# Patient Record
Sex: FEMALE | Race: WHITE | NOT HISPANIC OR LATINO | Employment: PART TIME
[De-identification: names, ages, dates, MRNs, and addresses within clinical notes are randomized per-mention and may not be internally consistent; named-entity substitution may affect disease eponyms.]

---

## 2023-06-05 ENCOUNTER — TRANSCRIBE ORDERS (OUTPATIENT)
Dept: OTHER | Age: 63
End: 2023-06-05

## 2023-06-05 DIAGNOSIS — K76.89 HEPATIC CYST: Primary | ICD-10-CM

## 2023-07-19 ENCOUNTER — OFFICE VISIT (OUTPATIENT)
Dept: GASTROENTEROLOGY | Facility: CLINIC | Age: 63
End: 2023-07-19
Attending: PHYSICIAN ASSISTANT
Payer: COMMERCIAL

## 2023-07-19 VITALS — HEART RATE: 64 BPM | SYSTOLIC BLOOD PRESSURE: 122 MMHG | DIASTOLIC BLOOD PRESSURE: 67 MMHG

## 2023-07-19 DIAGNOSIS — K76.89 LIVER CYST: Primary | ICD-10-CM

## 2023-07-19 DIAGNOSIS — K76.89 HEPATIC CYST: ICD-10-CM

## 2023-07-19 PROCEDURE — 99205 OFFICE O/P NEW HI 60 MIN: CPT | Performed by: STUDENT IN AN ORGANIZED HEALTH CARE EDUCATION/TRAINING PROGRAM

## 2023-07-19 RX ORDER — BUPRENORPHINE AND NALOXONE 2; .5 MG/1; MG/1
2 FILM, SOLUBLE BUCCAL; SUBLINGUAL 2 TIMES DAILY
COMMUNITY
Start: 2023-05-09

## 2023-07-19 RX ORDER — ONDANSETRON 4 MG/1
4 TABLET, FILM COATED ORAL PRN
COMMUNITY
Start: 2023-05-26

## 2023-07-19 RX ORDER — AMLODIPINE BESYLATE 5 MG/1
1 TABLET ORAL DAILY
COMMUNITY
Start: 2022-01-17

## 2023-07-19 RX ORDER — IBUPROFEN 600 MG/1
600 TABLET, FILM COATED ORAL PRN
COMMUNITY
Start: 2022-09-07

## 2023-07-19 RX ORDER — LISINOPRIL 20 MG/1
1 TABLET ORAL EVERY MORNING
COMMUNITY
Start: 2023-01-15

## 2023-07-19 NOTE — PROGRESS NOTES
[facility] New Patient Visit    Date of Visit: July 19, 2023    Reason for referral: liver cyst    Subjective: Ms. Reddy is a 63 year old woman who presents for further evaluation of a liver disease.     This was first found on a CT scan 2012- In the anterior segment of the right lobe of the liver there is a simple cyst that measures 3.8 x 3.7 centimeters.   The   patient is status post splenectomy.      US 6/2015 - The liver is 11.5 cm in craniocaudad length with multiple cysts the largest of 5.5 cm.     CT 6/2015 - Multiple cysts the largest is bilobed and up to 5.7 cm in size x 6.0     CT 1/2019 - There are several hypoattenuating lesions within the liver.  The largest is   either a multi septated cyst or a few adjacent cysts measuring 7.9 x 6.5 cm in   conglomeration.      MRI 4/2021 - T2 hyperintense lesion identified within the anterior right hepatic lobe   extending into the medial left hepatic lobe with a few septations measuring   measuring approximately 7.7 x 7.7 x 7.0 cm in size.  Similar but smaller lesion   identified within the right and left hepatic lobes measuring up to 1 cm in   size.  No suspicious areas of enhancement are identified.  No suspicious   enhancing liver lesions are identified.  The hepatic and portal veins are   patent.  No pathologically enlarged retroperitoneal lymph nodes.  No suspicious   areas of enhancement.  No suspicious areas of restricted diffusion.  IVC filter   is visualized.     MRI 4/2022 - There is no significant signal dropout in the liver to suggest fatty   infiltration on out of phase imaging.  There is a large septated cystic lesion   in segments 7, 8, and 4 a. this appears to have disrupted the hepatic capsule   with some fluid layering along the anterior and superior hepatic dome.  There   is no internal enhancement of the septations.  The lesion measures 7.9 x 9.5 by   7.7 cm.  This is not significantly changed from imaging in October  given   differences in technique.  Capsular rupture is new from April 2, 2021.      MRI Liver 5/2023 - Large T2 hyperintense lesion in the right hepatic lobe 9.1 by 8.7 by 9.0 cm.   Multiple septations along the anterior aspect which do not demonstrate   postcontrast enhancement.  No suspicious nodularity.  Several other smaller T2   hyperintense lesions within the liver most likely representing cysts.     No previous known liver disease, abnormal LFTs, imaging tests. No known history of liver decompensation    ROS: 14 point ROS negative except for positives noted in HPI.    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  BRCA mutation   Small bowel obstructions - improved with miralax, did not require surgery  Abdominal hernias    FamHx:  Breast cancer  BRCA  No family history of liver disease, liver cancer    SocHx:  Social History     Socioeconomic History     Marital status:      Spouse name: Not on file     Number of children: Not on file     Years of education: Not on file     Highest education level: Not on file   Occupational History     Not on file   Tobacco Use     Smoking status: Not on file     Smokeless tobacco: Not on file   Substance and Sexual Activity     Alcohol use: Not on file     Drug use: Not on file     Sexual activity: Not on file   Other Topics Concern     Not on file   Social History Narrative     Not on file     Social Determinants of Health     Financial Resource Strain: Not on file   Food Insecurity: Not on file   Transportation Needs: Not on file   Physical Activity: Not on file   Stress: Not on file   Social Connections: Not on file   Intimate Partner Violence: Not on file   Housing Stability: Not on file       Medications:  Current Outpatient Medications   Medication     amLODIPine (NORVASC) 5 MG tablet     buprenorphine HCl-naloxone HCl (SUBOXONE) 2-0.5 MG per film     ibuprofen  (ADVIL/MOTRIN) 600 MG tablet     lisinopril (ZESTRIL) 20 MG tablet     ondansetron (ZOFRAN) 4 MG tablet     No current facility-administered medications for this visit.     No OTCs, herbals    Allergies:  No Known Allergies    Objective:  /67 (BP Location: Right arm, Patient Position: Chair, Cuff Size: Adult Regular)   Pulse 64   Constitutional: pleasant woman in NAD  Eyes: non icteric  Respiratory: Normal respiratory excursion   MSK: normal range of motion of visualized extremities  Abd: Non distended  Skin: No jaundice  Psychiatric: normal mood and orientation    Labs:  LFTs normal    Imaging: Reviewed in EHR    Independently reviewed labs and imaging.     Assessment/Plan: Ms. Reddy is a 63 year old woman here for evaluation of a large liver cyst.  This cyst was first discovered more than 5 years ago has slowly grown over time.  It apparently ruptured in the interim June 2021.  On her most recent MRI, the cyst does not have any nodularity or arterial enhancement, but given the growth, concern for biliary cystadenoma was raised.  I do not have the images for my review, told her I would plan to review her images in our tumor conference.  If there is concern for biliary cystadenoma, would set up a consultation with our surgery colleagues.  She is intimately aware of complications after surgery, would prefer to avoid things unless absolutely necessary.  Her liver enzymes are normal.  She is not having any symptoms related to this.    RTC based on tumor board review    Annamarie Hoyos MD MS  Hepatology/Liver Transplant  [facility]    Approximately 20 minutes was spent for the visit with 40 minutes of non face-to-face time were spent in review of the patient's medical record on the day of the visit. This included review of previous: clinic visits, hospital records, lab results, imaging studies, and documentation.  The findings from this review are summarized in the above note.

## 2023-07-20 ENCOUNTER — TELEPHONE (OUTPATIENT)
Dept: GASTROENTEROLOGY | Facility: CLINIC | Age: 63
End: 2023-07-20
Payer: COMMERCIAL

## 2023-07-20 NOTE — TELEPHONE ENCOUNTER
Called and requested below images be pushed to [place]. All images are now uploaded into patients chart. Dr. Hoyos updated.    Yareli QUINTANA LPN  Hepatology Clinic      ----- Message from Annamarie Hoyos MD sent at 7/19/2023  9:55 PM CDT -----  Can you request the following imaging studies to be pushed here from [facility] for TC>    - MRI 5/2023  - MRI 2022  - MRI liver 4/2021  - CT AP 1/2019    Thank you

## 2023-08-25 DIAGNOSIS — K76.89 LIVER CYST: Primary | ICD-10-CM

## 2023-08-28 ENCOUNTER — PATIENT OUTREACH (OUTPATIENT)
Dept: SURGERY | Facility: CLINIC | Age: 63
End: 2023-08-28
Payer: COMMERCIAL

## 2023-08-28 NOTE — PROGRESS NOTES
New Patient Oncology Nurse Navigator Note     Referring provider: Dr. Hoyos     Referring Clinic/Organization: [facility]     Referred to: Hepatobiliary Surgery      Requested provider (if applicable): First available provider    Referral Received: 08/28/23       Evaluation for : Liver Cyst      Clinical History (per Nurse review of records provided):      See book marked documents:     Referring MD office note  Pathology report  Imaging reports   Procedure report       No Known Allergies     No past medical history on file.    No past surgical history on file.    Current Outpatient Medications   Medication Sig Dispense Refill    amLODIPine (NORVASC) 5 MG tablet Take 1 tablet by mouth daily      buprenorphine HCl-naloxone HCl (SUBOXONE) 2-0.5 MG per film Place 2 Film under the tongue 2 times daily      ibuprofen (ADVIL/MOTRIN) 600 MG tablet Take 600 mg by mouth as needed      lisinopril (ZESTRIL) 20 MG tablet Take 1 tablet by mouth every morning      ondansetron (ZOFRAN) 4 MG tablet Take 4 mg by mouth as needed          There is no problem list on file for this patient.        Clinical Assessment / Barriers to Care (Per Nurse):    None at this time.     Records Location:     [facility] Everywhere     Records Needed:     ABDOMINAL IMAGING (CT SCANS, MRI, US, PET SCANS)  DATING BACK TO 2015      Additional testing needed prior to consult:     NONE AT THIS TIME    Referral updates and Plan:       Consult with Surgical Oncology     08/28/2023 11:06 AM - called and spoke patient regarding scheduling consult with [facility] surgeon. Informed her we have multiple providers and different location option. Patient was transferred to scheduling to arrange for consult that works for patient.     Sherry Eason, RN, BSN   Surgical Oncology New Patient Nurse Navigator  [facility]  [phone]

## 2023-08-29 NOTE — TELEPHONE ENCOUNTER
RECORDS STATUS - ALL OTHER DIAGNOSIS    Liver cyst  RECORDS RECEIVED FROM: [facility]   Appt Date: 9/12/2023 with Dr. Camarillo    Action    Action Taken 8/29/2023 4:31pm KEB     I called [facility] IM[facility] Dept [facility] [phone]#2 #7 (imaging)     4:56pm KEB   I called [facility] again... they haven't pushed the scans yet.. they pulled up the pt's chart again.. they were a bit confused but will work on pushing scans over soon.     I resolved the scans in PACS       NOTES STATUS DETAILS   OFFICE NOTE from referring provider     OFFICE NOTE from medical oncologist     DISCHARGE SUMMARY from hospital     DISCHARGE REPORT from the ER Complete EPIC- 8/11/2023 Periumbilical Abdominal Pain    OPERATIVE REPORT     MEDICATION LIST Complete [facility]   CLINICAL TRIAL TREATMENTS TO DATE     LABS     PATHOLOGY REPORTS     ANYTHING RELATED TO DIAGNOSIS Complete Labs last updated on 8/11/2023   GENONOMIC TESTING     TYPE:     IMAGING (NEED IMAGES & REPORT)     CT SCANS Complete  [facility]  Extension for the film file room [id number]    MRN: [id number] CT Abdomen pelvis 12/26/2022, 2021   MRI Complete MRI Liver 5/26/2023 more in PACS   Xray Abdomen  Complete 5/18/2022   MAMMO     ULTRASOUND     PET

## 2023-09-12 ENCOUNTER — PRE VISIT (OUTPATIENT)
Dept: SURGERY | Facility: CLINIC | Age: 63
End: 2023-09-12
Payer: COMMERCIAL

## 2023-09-12 ENCOUNTER — ONCOLOGY VISIT (OUTPATIENT)
Dept: SURGERY | Facility: CLINIC | Age: 63
End: 2023-09-12
Attending: STUDENT IN AN ORGANIZED HEALTH CARE EDUCATION/TRAINING PROGRAM
Payer: COMMERCIAL

## 2023-09-12 VITALS
HEART RATE: 60 BPM | RESPIRATION RATE: 16 BRPM | HEIGHT: 65 IN | TEMPERATURE: 98.6 F | SYSTOLIC BLOOD PRESSURE: 124 MMHG | OXYGEN SATURATION: 97 % | WEIGHT: 130.2 LBS | DIASTOLIC BLOOD PRESSURE: 64 MMHG | BODY MASS INDEX: 21.69 KG/M2

## 2023-09-12 DIAGNOSIS — K76.89 LIVER CYST: ICD-10-CM

## 2023-09-12 PROCEDURE — G0463 HOSPITAL OUTPT CLINIC VISIT: HCPCS | Performed by: SURGERY

## 2023-09-12 PROCEDURE — 99203 OFFICE O/P NEW LOW 30 MIN: CPT | Performed by: SURGERY

## 2023-09-12 ASSESSMENT — PAIN SCALES - GENERAL: PAINLEVEL: NO PAIN (0)

## 2023-09-12 NOTE — PROGRESS NOTES
"Surgical Oncology - New Patient  9/12/2023    63 F w/ large septated right hepatic cyst (benign vs. cystadenoma).  This has grown over time and is now 10+ cm in greatest dimension.  It is not clearly symptomatic, but the patient has been having ongoing episodes of abdominal pain twice per year that brings her to the ER.  She has a complicated past surgical history including hysterectomy, left nephrectomy, and splenectomy, all via open incisions.  She tells me the thought by other physicians is that her recurrent intermittent abdominal pain may be related episodes of self limited bowel obstruction.  She has been referred for consideration of surgical management.    Of Note: She is currently feeling well and not having issues.  No inspiratory discomfort or discomfort with bending at the waist.  Abdomen soft, non-tender, and non-distended.  Midline laparotomy scar and lower transverse incision scar present.  No major cardiac or pulmonary problems.  No blood thinning medications.  Active and independent.    /64 (BP Location: Right arm)   Pulse 60   Temp 98.6  F (37  C) (Oral)   Resp 16   Ht 1.64 m (5' 4.57\")   Wt 59.1 kg (130 lb 3.2 oz)   SpO2 97%   BMI 21.96 kg/m      MRI Liver (5/26/2023): IMPRESSION:   1. Slowly enlarging right hepatic cyst versus biliary cystadenoma.  No new nodularity or suspicious features.   2. Additional chronic and incidental findings are unchanged.    No recent labs.  Prior LFTs normal.  Prior CBCs without thrombocytopenia.    Assessment/Plan:  63 F w/ large septated right hepatic cyst (benign vs. cystadenoma).  This has grown over time and is now 10+ cm in greatest dimension.  It is not clearly symptomatic.  She has had multiple prior abdominal operations, likely complicated by presence of intra-abdominal adhesions.  We discussed the cyst and the likely differential of benign versus pre-malignant cyst.  This distinction is important given pre-malignant cysts can degenerate to or " harbor occult malignancy and are ideally managed with excision/resection.  Especially when they are as large as hers.  The problem is that there is not a clear diagnosis based on imaging and this could also be benign.  In that case, major liver surgery would be overkill.  I think we should repeat her imaging (MRI/MRCP) and have a liver radiologist here read it for characterization and assessment of changes.  If still relatively indeterminate, I would recommend attempted laparoscopic cyst unroofing for definitive diagnosis and potential management.  This will help us with downstream decision making in terms of no further treatment or in definitive management/resection of a pre-malignant cyst.  The main downside to this is that if occult malignancy is present (very low likelihood in her case), then there is risk of tumor spillage with unroofing the cyst.  She understood.  The dilemma will be what to do in the case that her abdomen is not accessible to laparoscopic entry.  In that case, unroofing would need to be done open, but this would complicate later definitive resection in the even this was found to be a pre-malignant cyst.  Thus, we will have to think about this in the time that it takes to repeat her imaging.  We will order the repeat MRI/MRCP and I will touch base with her via phone visit following the MRI to discuss the next steps.  Questions answered and the patient was in agreement with and understanding of the plan.    A total of 35 minutes were spent on this encounter including face to face consultation, imaging review, chart review, documentation, and coordination of care.

## 2023-09-12 NOTE — NURSING NOTE
"Oncology Rooming Note    September 12, 2023 1:20 PM   Lavern Reddy is a 63 year old female who presents for:    Chief Complaint   Patient presents with    Oncology Clinic Visit     Liver cyst     Initial Vitals: /64 (BP Location: Right arm)   Pulse 60   Temp 98.6  F (37  C) (Oral)   Resp 16   Ht 1.64 m (5' 4.57\")   Wt 59.1 kg (130 lb 3.2 oz)   SpO2 97%   BMI 21.96 kg/m   Estimated body mass index is 21.96 kg/m  as calculated from the following:    Height as of this encounter: 1.64 m (5' 4.57\").    Weight as of this encounter: 59.1 kg (130 lb 3.2 oz). Body surface area is 1.64 meters squared.  No Pain (0) Comment: Data Unavailable   No LMP recorded.  Allergies reviewed: Yes  Medications reviewed: Yes    Medications: Medication refills not needed today.  Pharmacy name entered into [facility]: [facility] [address] [place] [address]    Clinical concerns: none       Fatimah Marcial              "

## 2023-09-12 NOTE — LETTER
"    9/12/2023         RE: Lavern Reddy  [address] [address] [address]        Dear Colleague,    Thank you for referring your patient, Lavern Reddy, to the [facility]. Please see a copy of my visit note below.    Surgical Oncology - New Patient  9/12/2023    63 F w/ large septated right hepatic cyst (benign vs. cystadenoma).  This has grown over time and is now 10+ cm in greatest dimension.  It is not clearly symptomatic, but the patient has been having ongoing episodes of abdominal pain twice per year that brings her to the ER.  She has a complicated past surgical history including hysterectomy, left nephrectomy, and splenectomy, all via open incisions.  She tells me the thought by other physicians is that her recurrent intermittent abdominal pain may be related episodes of self limited bowel obstruction.  She has been referred for consideration of surgical management.    Of Note: She is currently feeling well and not having issues.  No inspiratory discomfort or discomfort with bending at the waist.  Abdomen soft, non-tender, and non-distended.  Midline laparotomy scar and lower transverse incision scar present.  No major cardiac or pulmonary problems.  No blood thinning medications.  Active and independent.    /64 (BP Location: Right arm)   Pulse 60   Temp 98.6  F (37  C) (Oral)   Resp 16   Ht 1.64 m (5' 4.57\")   Wt 59.1 kg (130 lb 3.2 oz)   SpO2 97%   BMI 21.96 kg/m      MRI Liver (5/26/2023): IMPRESSION:   1. Slowly enlarging right hepatic cyst versus biliary cystadenoma.  No new nodularity or suspicious features.   2. Additional chronic and incidental findings are unchanged.    No recent labs.  Prior LFTs normal.  Prior CBCs without thrombocytopenia.    Assessment/Plan:  63 F w/ large septated right hepatic cyst (benign vs. cystadenoma).  This has grown over time and is now 10+ cm in greatest dimension.  It is not clearly symptomatic.  She has had multiple " prior abdominal operations, likely complicated by presence of intra-abdominal adhesions.  We discussed the cyst and the likely differential of benign versus pre-malignant cyst.  This distinction is important given pre-malignant cysts can degenerate to or harbor occult malignancy and are ideally managed with excision/resection.  Especially when they are as large as hers.  The problem is that there is not a clear diagnosis based on imaging and this could also be benign.  In that case, major liver surgery would be overkill.  I think we should repeat her imaging (MRI/MRCP) and have a liver radiologist here read it for characterization and assessment of changes.  If still relatively indeterminate, I would recommend attempted laparoscopic cyst unroofing for definitive diagnosis and potential management.  This will help us with downstream decision making in terms of no further treatment or in definitive management/resection of a pre-malignant cyst.  The main downside to this is that if occult malignancy is present (very low likelihood in her case), then there is risk of tumor spillage with unroofing the cyst.  She understood.  The dilemma will be what to do in the case that her abdomen is not accessible to laparoscopic entry.  In that case, unroofing would need to be done open, but this would complicate later definitive resection in the even this was found to be a pre-malignant cyst.  Thus, we will have to think about this in the time that it takes to repeat her imaging.  We will order the repeat MRI/MRCP and I will touch base with her via phone visit following the MRI to discuss the next steps.  Questions answered and the patient was in agreement with and understanding of the plan.    A total of 35 minutes were spent on this encounter including face to face consultation, imaging review, chart review, documentation, and coordination of care.    Sincerely,        Bruno Camarillo MD

## 2023-09-18 ENCOUNTER — ANCILLARY PROCEDURE (OUTPATIENT)
Dept: MRI IMAGING | Facility: CLINIC | Age: 63
End: 2023-09-18
Attending: SURGERY
Payer: COMMERCIAL

## 2023-09-18 DIAGNOSIS — K76.89 LIVER CYST: ICD-10-CM

## 2023-09-18 PROCEDURE — A9585 GADOBUTROL INJECTION: HCPCS | Mod: JZ | Performed by: RADIOLOGY

## 2023-09-18 PROCEDURE — 74183 MRI ABD W/O CNTR FLWD CNTR: CPT | Performed by: RADIOLOGY

## 2023-09-18 RX ORDER — GADOBUTROL 604.72 MG/ML
6 INJECTION INTRAVENOUS ONCE
Status: COMPLETED | OUTPATIENT
Start: 2023-09-18 | End: 2023-09-18

## 2023-09-18 RX ADMIN — GADOBUTROL 6 ML: 604.72 INJECTION INTRAVENOUS at 14:55

## 2023-09-26 ENCOUNTER — VIRTUAL VISIT (OUTPATIENT)
Dept: SURGERY | Facility: CLINIC | Age: 63
End: 2023-09-26
Attending: SURGERY
Payer: COMMERCIAL

## 2023-09-26 DIAGNOSIS — K76.89 LIVER CYST: ICD-10-CM

## 2023-09-26 PROCEDURE — 99441 PR PHYSICIAN TELEPHONE EVALUATION 5-10 MIN: CPT | Mod: 95 | Performed by: SURGERY

## 2023-09-26 NOTE — LETTER
9/26/2023         RE: Lavern Reddy  [address] [address] [address]        Dear Colleague,    Thank you for referring your patient, Lavern Reddy, to the [facility]. Please see a copy of my visit note below.    Virtual Visit Details    Type of service:  Telephone Visit   Phone call duration: 3 minutes     Surgical Oncology - Established Patient  9/26/2023    63 F w/ large septated right hepatic cyst (benign vs. cystadenoma). This has grown over time and is now 10+ cm in greatest dimension. It is not clearly symptomatic, but the patient has been having ongoing episodes of abdominal pain twice per year that brings her to the ER. She has a complicated past surgical history including hysterectomy, left nephrectomy, and splenectomy, all via open incisions. She tells me the thought by other physicians is that her recurrent intermittent abdominal pain may be related episodes of self limited bowel obstruction. She has been referred for consideration of surgical management of the cyst.    Of Note: Since I last saw her, she had a new MRI/MRCP done for characterization of the cyst.  There have otherwise been no changes since our last visit.    MRI/MRCP (9/18/2023): IMPRESSION:   1. Large but simple cystic mass at the central liver as detailed above. Currently it has a maximal size of 10.9 cm, recently stable, but larger when compared to the oldest available imaging from 1/10/2019. Given its appearance, this is likely benign in nature. However, as it is slowly enlarging, imaging surveillance and surgical consultation is recommended for management purposes.    Assessment/Plan:  63 F w/ large (10+ cm), likely simple benign cyst of the right hepatic lobe.  This is based on the most recent MRI/MRCP.  This is not clearly symptomatic and the patient has extensive intra-abdominal surgical history likely complicated by significant intra-abdominal adhesions.  We discussed the cyst and the likely  benign nature given the most recent MRI/MRCP.  Given this, I have recommended holding off on cyst unroofing given it is not clearly symptomatic, surgery would likely be complicated by intra-abdominal adhesions potentially requiring open surgery, and the lack of clear benefit from doing this.  I would recommend that we see her back in 6 months with repeat MRI/MRCP to be sure there are no worrisome changes in the cyst.  I also plan on discussing her at the upcoming liver tumor board to be sure the group agrees on the diagnosis of a likely benign cyst and is on board with surveillance at this time, as opposed to surgical management.  The patient was very satisfied with this plan and I will plan on touching base with her after the tumor conference.  We will arrange 6 month follow-up and MRI/MRCP.  Questions answered and the patient was in agreement with and understanding of the plan.    A total of 10 minutes were spent on this encounter including phone consultation, imaging review, chart review, documentation, and coordination of care.      Again, thank you for allowing me to participate in the care of your patient.        Sincerely,        Bruno Camarillo MD

## 2023-09-26 NOTE — NURSING NOTE
Is the patient currently in the state of [place]? YES    Visit mode:TELEPHONE    If the visit is dropped, the patient can be reconnected by: TELEPHONE VISIT: Phone number:   Telephone Information:   Mobile [phone]       Will anyone else be joining the visit? NO  (If patient encounters technical issues they should call [phone]    How would you like to obtain your AVS? Declined    Are changes needed to the allergy or medication list? No    Reason for visit: No chief complaint on file.    Maurilio LINK

## 2023-09-26 NOTE — PROGRESS NOTES
Virtual Visit Details    Type of service:  Telephone Visit   Phone call duration: 3 minutes     Surgical Oncology - Established Patient  9/26/2023    63 F w/ large septated right hepatic cyst (benign vs. cystadenoma). This has grown over time and is now 10+ cm in greatest dimension. It is not clearly symptomatic, but the patient has been having ongoing episodes of abdominal pain twice per year that brings her to the ER. She has a complicated past surgical history including hysterectomy, left nephrectomy, and splenectomy, all via open incisions. She tells me the thought by other physicians is that her recurrent intermittent abdominal pain may be related episodes of self limited bowel obstruction. She has been referred for consideration of surgical management of the cyst.    Of Note: Since I last saw her, she had a new MRI/MRCP done for characterization of the cyst.  There have otherwise been no changes since our last visit.    MRI/MRCP (9/18/2023): IMPRESSION:   1. Large but simple cystic mass at the central liver as detailed above. Currently it has a maximal size of 10.9 cm, recently stable, but larger when compared to the oldest available imaging from 1/10/2019. Given its appearance, this is likely benign in nature. However, as it is slowly enlarging, imaging surveillance and surgical consultation is recommended for management purposes.    Assessment/Plan:  63 F w/ large (10+ cm), likely simple benign cyst of the right hepatic lobe.  This is based on the most recent MRI/MRCP.  This is not clearly symptomatic and the patient has extensive intra-abdominal surgical history likely complicated by significant intra-abdominal adhesions.  We discussed the cyst and the likely benign nature given the most recent MRI/MRCP.  Given this, I have recommended holding off on cyst unroofing given it is not clearly symptomatic, surgery would likely be complicated by intra-abdominal adhesions potentially requiring open surgery, and  the lack of clear benefit from doing this.  I would recommend that we see her back in 6 months with repeat MRI/MRCP to be sure there are no worrisome changes in the cyst.  I also plan on discussing her at the upcoming liver tumor board to be sure the group agrees on the diagnosis of a likely benign cyst and is on board with surveillance at this time, as opposed to surgical management.  The patient was very satisfied with this plan and I will plan on touching base with her after the tumor conference.  We will arrange 6 month follow-up and MRI/MRCP.  Questions answered and the patient was in agreement with and understanding of the plan.    A total of 10 minutes were spent on this encounter including phone consultation, imaging review, chart review, documentation, and coordination of care.

## 2023-10-02 ENCOUNTER — TELEPHONE (OUTPATIENT)
Dept: SURGERY | Facility: CLINIC | Age: 63
End: 2023-10-02
Payer: COMMERCIAL

## 2023-10-03 NOTE — TELEPHONE ENCOUNTER
I called and had a discussion with the patient.  We discussed her in liver tumor board.  Radiology thought the cyst had a benign appearance on her newest imaging.  The group agreed that given appearance, significant prior surgical history, and lack of clear symptoms, that surveillance was the best course of action at this time.  Surveillance will be pursued as it has grown some since 2019.  We will see her in 6 months with repeat MRI/MRCP to keep an eye on this.  Questions answered and the patient was in agreement with and understanding of the plan.

## 2024-02-14 ENCOUNTER — PATIENT OUTREACH (OUTPATIENT)
Dept: ONCOLOGY | Facility: CLINIC | Age: 64
End: 2024-02-14
Payer: COMMERCIAL

## 2024-02-14 NOTE — PROGRESS NOTES
[facility]: Surgical Oncology Cancer Care Short Note                                     Discussion with Patient:                                                         OUTBOUND CALL:     Spoke with Myra regarding plan to reschedule visit on 4/23/2024 with Dr. Camarillo  due to him now being on an extended leave of absence. Myra stated she is now planning to stay in [place] through early May and will need to reschedule her scan and visit with provider.     Plan made to reschedule scan to mid May followed by a visit with either Dr. Ford or Dr. Florentino 2-10 days following the scan. Myra verbalized understanding to the plan. Message sent to Scheduling Team to reschedule Surgical Oncology appointments.     Encouraged Myra to call with any further questions or concerns. Direct contact number provided.     Glenna Valdez RNCC  [facility]   Surgical Oncology     Approximately 5 minutes was spent in conversation with the patient/caregiver.

## 2024-05-22 ENCOUNTER — PATIENT OUTREACH (OUTPATIENT)
Dept: ONCOLOGY | Facility: CLINIC | Age: 64
End: 2024-05-22
Payer: COMMERCIAL

## 2024-08-30 ENCOUNTER — ANCILLARY PROCEDURE (OUTPATIENT)
Dept: MRI IMAGING | Facility: CLINIC | Age: 64
End: 2024-08-30
Attending: SURGERY
Payer: COMMERCIAL

## 2024-08-30 DIAGNOSIS — K76.89 LIVER CYST: ICD-10-CM

## 2024-08-30 PROCEDURE — 74183 MRI ABD W/O CNTR FLWD CNTR: CPT | Performed by: RADIOLOGY

## 2024-08-30 PROCEDURE — A9585 GADOBUTROL INJECTION: HCPCS | Performed by: RADIOLOGY

## 2024-08-30 RX ORDER — GADOBUTROL 604.72 MG/ML
6 INJECTION INTRAVENOUS ONCE
OUTPATIENT
Start: 2024-08-30 | End: 2024-08-30

## 2024-08-30 RX ORDER — GADOBUTROL 604.72 MG/ML
6 INJECTION INTRAVENOUS ONCE
Status: COMPLETED | OUTPATIENT
Start: 2024-08-30 | End: 2024-08-30

## 2024-08-30 RX ADMIN — GADOBUTROL 6 ML: 604.72 INJECTION INTRAVENOUS at 11:29

## 2024-09-03 ENCOUNTER — VIRTUAL VISIT (OUTPATIENT)
Dept: SURGERY | Facility: CLINIC | Age: 64
End: 2024-09-03
Attending: SURGERY
Payer: COMMERCIAL

## 2024-09-03 VITALS
BODY MASS INDEX: 21.68 KG/M2 | SYSTOLIC BLOOD PRESSURE: 144 MMHG | WEIGHT: 127 LBS | DIASTOLIC BLOOD PRESSURE: 79 MMHG | HEIGHT: 64 IN

## 2024-09-03 DIAGNOSIS — K76.89 LIVER CYST: Primary | ICD-10-CM

## 2024-09-03 PROCEDURE — 99212 OFFICE O/P EST SF 10 MIN: CPT | Mod: 95 | Performed by: SURGERY

## 2024-09-03 RX ORDER — BIMATOPROST 3 UG/ML
1 SOLUTION TOPICAL
COMMUNITY
Start: 2023-08-14

## 2024-09-03 ASSESSMENT — PAIN SCALES - GENERAL: PAINLEVEL: NO PAIN (0)

## 2024-09-03 NOTE — PROGRESS NOTES
Virtual Visit Details    Type of service:  Video Visit   Video Start - End: 11:02 AM - 11:06 AM    Originating Location (pt. Location): Home    Distant Location (provider location):  On-site  Platform used for Video Visit: [facility]    Surgical Oncology - Established Patient  9/3/2024     64 F w/ large septated right hepatic cyst (likely benign based on prior discussion in tumor board).  This has grown over time and is now 12+ cm in greatest dimension.  It is not clearly symptomatic.  She has a complicated past surgical history including hysterectomy, left nephrectomy, and splenectomy, all via open incisions.  In the past, given no clear symptoms attributable to the cyst and her complicated surgical history in combination with likely benign etiology, we have opted for expectant management/surveillance.  She returns for this follow-up with new imaging.     Of Note: Since I last saw her, she had a new MRI/MRCP done for characterization of the cyst.  There have otherwise been no changes since our last visit.  She does not have any abdominal symptoms.     MRI/MRCP (9/18/2023): IMPRESSION:   1. Large but simple cystic mass at the central liver as detailed above. Currently it has a maximal size of 10.9 cm, recently stable, but larger when compared to the oldest available imaging from 1/10/2019. Given its appearance, this is likely benign in nature. However, as it is slowly enlarging, imaging surveillance and surgical consultation is recommended for management purposes.    MRI/MRCP (8/30/2024): IMPRESSION:  1. Slightly increased size of large T2 hyperintense right hepatic lobe cyst with several thin internal septations compared to MRI 9/18/2023.     Assessment/Plan:  63 F w/ large (12+ cm), likely simple benign cyst of the right hepatic lobe.  This is based on characterization via MRI/MRCP, as well as prior discussion in tumor board.  This is not clearly symptomatic and the patient has extensive intra-abdominal surgical  history likely complicated by significant intra-abdominal adhesions.  We briefly re-discussed the cyst and the likely benign nature.  Given likely benign nature, lack of symptoms, and potentially complicated surgery (prior abdominal operations), I have recommended holding off on cyst unroofing.  I would recommend that we see her back in 12 months with repeat MRI/MRCP to be sure there are no worrisome changes in the cyst.  If there are no worrisome changes at that time, then imaging surveillance could likely stop and she would just return if she developed symptoms.  The patient was very satisfied with this plan.  We will arrange 12 month follow-up and MRI/MRCP.  Questions answered and the patient was in agreement with and understanding of the plan.     A total of 10 minutes were spent on this encounter including phone consultation, imaging review, chart review, documentation, and coordination of care.

## 2024-09-03 NOTE — NURSING NOTE
Current patient location: [address] [place] [id number]    Is the patient currently in the state of [place]? YES    Visit mode:VIDEO    If the visit is dropped, the patient can be reconnected by: VIDEO VISIT: Text to cell phone:   Telephone Information:   Mobile [phone]       Will anyone else be joining the visit? NO  (If patient encounters technical issues they should call [phone]    How would you like to obtain your AVS? Mail a copy    Are changes needed to the allergy or medication list? Pt stated no changes to allergies and Pt stated no med changes    Are refills needed on medications prescribed by this physician? NO    Rooming Documentation:  Questionnaire(s) completed      Reason for visit: RUY PALMERF

## 2024-09-03 NOTE — LETTER
9/3/2024      Lavern Reddy  [id number] [address] [address]      Dear Colleague,    Thank you for referring your patient, Lavern Reddy, to the [facility]. Please see a copy of my visit note below.    Virtual Visit Details    Type of service:  Video Visit   Video Start - End: 11:02 AM - 11:06 AM    Originating Location (pt. Location): Home    Distant Location (provider location):  On-site  Platform used for Video Visit: [facility]    Surgical Oncology - Established Patient  9/3/2024     64 F w/ large septated right hepatic cyst (likely benign based on prior discussion in tumor board).  This has grown over time and is now 12+ cm in greatest dimension.  It is not clearly symptomatic.  She has a complicated past surgical history including hysterectomy, left nephrectomy, and splenectomy, all via open incisions.  In the past, given no clear symptoms attributable to the cyst and her complicated surgical history in combination with likely benign etiology, we have opted for expectant management/surveillance.  She returns for this follow-up with new imaging.     Of Note: Since I last saw her, she had a new MRI/MRCP done for characterization of the cyst.  There have otherwise been no changes since our last visit.  She does not have any abdominal symptoms.     MRI/MRCP (9/18/2023): IMPRESSION:   1. Large but simple cystic mass at the central liver as detailed above. Currently it has a maximal size of 10.9 cm, recently stable, but larger when compared to the oldest available imaging from 1/10/2019. Given its appearance, this is likely benign in nature. However, as it is slowly enlarging, imaging surveillance and surgical consultation is recommended for management purposes.    MRI/MRCP (8/30/2024): IMPRESSION:  1. Slightly increased size of large T2 hyperintense right hepatic lobe cyst with several thin internal septations compared to MRI 9/18/2023.     Assessment/Plan:  63 F w/ large (12+ cm),  likely simple benign cyst of the right hepatic lobe.  This is based on characterization via MRI/MRCP, as well as prior discussion in tumor board.  This is not clearly symptomatic and the patient has extensive intra-abdominal surgical history likely complicated by significant intra-abdominal adhesions.  We briefly re-discussed the cyst and the likely benign nature.  Given likely benign nature, lack of symptoms, and potentially complicated surgery (prior abdominal operations), I have recommended holding off on cyst unroofing.  I would recommend that we see her back in 12 months with repeat MRI/MRCP to be sure there are no worrisome changes in the cyst.  If there are no worrisome changes at that time, then imaging surveillance could likely stop and she would just return if she developed symptoms.  The patient was very satisfied with this plan.  We will arrange 12 month follow-up and MRI/MRCP.  Questions answered and the patient was in agreement with and understanding of the plan.     A total of 10 minutes were spent on this encounter including phone consultation, imaging review, chart review, documentation, and coordination of care.      Again, thank you for allowing me to participate in the care of your patient.        Sincerely,        Bruno Camarillo MD

## 2025-01-12 ENCOUNTER — HEALTH MAINTENANCE LETTER (OUTPATIENT)
Age: 65
End: 2025-01-12

## 2025-09-02 ENCOUNTER — VIRTUAL VISIT (OUTPATIENT)
Dept: SURGERY | Facility: CLINIC | Age: 65
End: 2025-09-02
Attending: SURGERY
Payer: COMMERCIAL

## 2025-09-02 DIAGNOSIS — K76.89 LIVER CYST: Primary | ICD-10-CM
